# Patient Record
Sex: FEMALE | Race: WHITE | URBAN - NONMETROPOLITAN AREA
[De-identification: names, ages, dates, MRNs, and addresses within clinical notes are randomized per-mention and may not be internally consistent; named-entity substitution may affect disease eponyms.]

---

## 2022-03-28 ENCOUNTER — OFFICE VISIT (OUTPATIENT)
Dept: URBAN - NONMETROPOLITAN AREA CLINIC 4 | Facility: CLINIC | Age: 42
End: 2022-03-28
Payer: COMMERCIAL

## 2022-03-28 ENCOUNTER — LAB OUTSIDE AN ENCOUNTER (OUTPATIENT)
Dept: URBAN - NONMETROPOLITAN AREA CLINIC 4 | Facility: CLINIC | Age: 42
End: 2022-03-28

## 2022-03-28 ENCOUNTER — WEB ENCOUNTER (OUTPATIENT)
Dept: URBAN - NONMETROPOLITAN AREA CLINIC 4 | Facility: CLINIC | Age: 42
End: 2022-03-28

## 2022-03-28 VITALS
SYSTOLIC BLOOD PRESSURE: 94 MMHG | HEART RATE: 78 BPM | BODY MASS INDEX: 32.82 KG/M2 | DIASTOLIC BLOOD PRESSURE: 64 MMHG | WEIGHT: 204.2 LBS | TEMPERATURE: 98.2 F | HEIGHT: 66 IN

## 2022-03-28 DIAGNOSIS — N80.9 ENDOMETRIOSIS: ICD-10-CM

## 2022-03-28 DIAGNOSIS — N93.9 ABNORMAL VAGINAL BLEEDING: ICD-10-CM

## 2022-03-28 DIAGNOSIS — R13.10 DYSPHAGIA, UNSPECIFIED TYPE: ICD-10-CM

## 2022-03-28 DIAGNOSIS — R14.2 BELCHING: ICD-10-CM

## 2022-03-28 DIAGNOSIS — K21.9 GASTROESOPHAGEAL REFLUX DISEASE, UNSPECIFIED WHETHER ESOPHAGITIS PRESENT: ICD-10-CM

## 2022-03-28 DIAGNOSIS — R12 HEARTBURN: ICD-10-CM

## 2022-03-28 PROBLEM — 301822002: Status: ACTIVE | Noted: 2022-03-28

## 2022-03-28 PROBLEM — 129103003: Status: ACTIVE | Noted: 2022-03-28

## 2022-03-28 PROCEDURE — 99204 OFFICE O/P NEW MOD 45 MIN: CPT | Performed by: REGISTERED NURSE

## 2022-03-28 PROCEDURE — 99204 OFFICE O/P NEW MOD 45 MIN: CPT | Performed by: INTERNAL MEDICINE

## 2022-03-28 RX ORDER — LANSOPRAZOLE 30 MG
1 CAPSULE BEFORE A MEAL CAPSULE,DELAYED RELEASE (ENTERIC COATED) ORAL ONCE A DAY
Status: ACTIVE | COMMUNITY

## 2022-03-28 RX ORDER — LANOLIN ALCOHOL/MO/W.PET/CERES
1 TABLET CREAM (GRAM) TOPICAL ONCE A DAY
Status: ACTIVE | COMMUNITY

## 2022-03-28 RX ORDER — FAMOTIDINE 20 MG/1
1 TABLET AT BEDTIME AS NEEDED TABLET, FILM COATED ORAL ONCE A DAY
Status: ACTIVE | COMMUNITY

## 2022-03-28 RX ORDER — MULTIVIT-MIN/IRON/FOLIC ACID/K 18-600-40
AS DIRECTED CAPSULE ORAL
Status: ACTIVE | COMMUNITY

## 2022-03-28 RX ORDER — SUCRALFATE 1 G/1
1 TABLET ON AN EMPTY STOMACH TABLET ORAL TWICE A DAY
Status: ACTIVE | COMMUNITY

## 2022-03-28 RX ORDER — MONTELUKAST 10 MG/1
1 TABLET TABLET, FILM COATED ORAL ONCE A DAY
Status: ACTIVE | COMMUNITY

## 2022-03-28 RX ORDER — TRAZODONE HYDROCHLORIDE 50 MG/1
1 TABLET AT BEDTIME AS NEEDED TABLET, FILM COATED ORAL ONCE A DAY
Status: ACTIVE | COMMUNITY

## 2022-03-28 RX ORDER — PROPRANOLOL HYDROCHLORIDE 80 MG/1
1 TABLET TABLET ORAL TWICE A DAY
Status: ACTIVE | COMMUNITY

## 2022-03-28 RX ORDER — PANTOPRAZOLE SODIUM 40 MG/1
1 TABLET TABLET, DELAYED RELEASE ORAL ONCE A DAY
Qty: 30 | Refills: 1 | OUTPATIENT
Start: 2022-03-28

## 2022-03-28 RX ORDER — FLUOXETINE HYDROCHLORIDE 20 MG/1
1 CAPSULE CAPSULE ORAL ONCE A DAY
Status: ACTIVE | COMMUNITY

## 2022-03-28 RX ORDER — NICOTINE 14MG/24HR
1 CAPSULE PATCH, TRANSDERMAL 24 HOURS TRANSDERMAL TWICE A DAY
Status: ACTIVE | COMMUNITY

## 2022-03-28 NOTE — HPI-TODAY'S VISIT:
3/28/22: Pt is a 40 yo female with PMH of GERD, anemia, endometriosis, anxiety and depression who was referred by FAITH Peters, for evaluation of GERD.  Pt reports long stadning GERD for at least 15 years. She has never seen GI in past. She has been to ED multiple times for GERD symptoms. She has associated heartburn, worse with coffee, soda, spicy foods, fried foods, tomatoes. She has taken PPI, which had been controlling symptoms, but now worse in past 2 years. She has occasional dysphagia to solids and pills. Also reports belching and early satiety. Denies nausea, vomiting, melena, hematochezia, unintentional weight loss. She admits to taking chronic NSAIDs in past. Has never had an EGD in past. PCP recently started her on Carafate. She has stopped taking Prevacid and Pepcid. No FHx of GI malignancy. Of note, she has hx of anemia with iron, Vit D and B12 def. Most recent labs showed normal iron levels. Does not take iron supplement any more. Has been having vaginal bleeding for 5 weeks, Has f/u with GYN.

## 2022-04-21 PROBLEM — 235595009: Status: ACTIVE | Noted: 2022-03-28

## 2022-04-21 PROBLEM — 40739000: Status: ACTIVE | Noted: 2022-03-28

## 2022-05-03 ENCOUNTER — OFFICE VISIT (OUTPATIENT)
Dept: URBAN - METROPOLITAN AREA SURGERY CENTER 14 | Facility: SURGERY CENTER | Age: 42
End: 2022-05-03
Payer: COMMERCIAL

## 2022-05-03 ENCOUNTER — CLAIMS CREATED FROM THE CLAIM WINDOW (OUTPATIENT)
Dept: URBAN - METROPOLITAN AREA CLINIC 4 | Facility: CLINIC | Age: 42
End: 2022-05-03
Payer: COMMERCIAL

## 2022-05-03 DIAGNOSIS — K22.4 CORKSCREW ESOPHAGUS: ICD-10-CM

## 2022-05-03 DIAGNOSIS — K31.7 BENIGN GASTRIC POLYP: ICD-10-CM

## 2022-05-03 DIAGNOSIS — K21.9 GASTRO-ESOPHAGEAL REFLUX DISEASE WITHOUT ESOPHAGITIS: ICD-10-CM

## 2022-05-03 DIAGNOSIS — K31.7 POLYP OF STOMACH AND DUODENUM: ICD-10-CM

## 2022-05-03 DIAGNOSIS — K21.9 ACID REFLUX: ICD-10-CM

## 2022-05-03 DIAGNOSIS — R13.19 CERVICAL DYSPHAGIA: ICD-10-CM

## 2022-05-03 DIAGNOSIS — K31.89 FOCAL FOVEOLAR HYPERPLASIA: ICD-10-CM

## 2022-05-03 PROCEDURE — G8907 PT DOC NO EVENTS ON DISCHARG: HCPCS | Performed by: INTERNAL MEDICINE

## 2022-05-03 PROCEDURE — 88305 TISSUE EXAM BY PATHOLOGIST: CPT | Performed by: PATHOLOGY

## 2022-05-03 PROCEDURE — 43239 EGD BIOPSY SINGLE/MULTIPLE: CPT | Performed by: INTERNAL MEDICINE

## 2022-05-03 PROCEDURE — 43450 DILATE ESOPHAGUS 1/MULT PASS: CPT | Performed by: INTERNAL MEDICINE

## 2022-05-03 PROCEDURE — 88312 SPECIAL STAINS GROUP 1: CPT | Performed by: PATHOLOGY

## 2022-05-03 PROCEDURE — 43251 EGD REMOVE LESION SNARE: CPT | Performed by: INTERNAL MEDICINE

## 2022-05-03 RX ORDER — PANTOPRAZOLE SODIUM 40 MG/1
1 TABLET TABLET, DELAYED RELEASE ORAL ONCE A DAY
Qty: 30 | Refills: 1 | COMMUNITY
Start: 2022-03-28

## 2022-05-03 RX ORDER — TRAZODONE HYDROCHLORIDE 50 MG/1
1 TABLET AT BEDTIME AS NEEDED TABLET, FILM COATED ORAL ONCE A DAY
COMMUNITY

## 2022-05-03 RX ORDER — MONTELUKAST 10 MG/1
1 TABLET TABLET, FILM COATED ORAL ONCE A DAY
COMMUNITY

## 2022-05-03 RX ORDER — LANSOPRAZOLE 30 MG
1 CAPSULE BEFORE A MEAL CAPSULE,DELAYED RELEASE (ENTERIC COATED) ORAL ONCE A DAY
COMMUNITY

## 2022-05-03 RX ORDER — FLUOXETINE HYDROCHLORIDE 20 MG/1
1 CAPSULE CAPSULE ORAL ONCE A DAY
COMMUNITY

## 2022-05-03 RX ORDER — SUCRALFATE 1 G/1
1 TABLET ON AN EMPTY STOMACH TABLET ORAL TWICE A DAY
COMMUNITY

## 2022-05-03 RX ORDER — NICOTINE 14MG/24HR
1 CAPSULE PATCH, TRANSDERMAL 24 HOURS TRANSDERMAL TWICE A DAY
COMMUNITY

## 2022-05-03 RX ORDER — PROPRANOLOL HYDROCHLORIDE 80 MG/1
1 TABLET TABLET ORAL TWICE A DAY
COMMUNITY

## 2022-05-03 RX ORDER — LANOLIN ALCOHOL/MO/W.PET/CERES
1 TABLET CREAM (GRAM) TOPICAL ONCE A DAY
COMMUNITY

## 2022-05-03 RX ORDER — FAMOTIDINE 20 MG/1
1 TABLET AT BEDTIME AS NEEDED TABLET, FILM COATED ORAL ONCE A DAY
COMMUNITY

## 2022-05-03 RX ORDER — MULTIVIT-MIN/IRON/FOLIC ACID/K 18-600-40
AS DIRECTED CAPSULE ORAL
COMMUNITY

## 2022-05-23 ENCOUNTER — OFFICE VISIT (OUTPATIENT)
Dept: URBAN - METROPOLITAN AREA CLINIC 54 | Facility: CLINIC | Age: 42
End: 2022-05-23
Payer: COMMERCIAL

## 2022-05-23 VITALS
BODY MASS INDEX: 33.27 KG/M2 | WEIGHT: 207 LBS | HEART RATE: 78 BPM | DIASTOLIC BLOOD PRESSURE: 74 MMHG | HEIGHT: 66 IN | TEMPERATURE: 97.9 F | SYSTOLIC BLOOD PRESSURE: 111 MMHG

## 2022-05-23 DIAGNOSIS — K22.4 ESOPHAGEAL DYSMOTILITY: ICD-10-CM

## 2022-05-23 DIAGNOSIS — K21.9 GASTROESOPHAGEAL REFLUX DISEASE WITHOUT ESOPHAGITIS: ICD-10-CM

## 2022-05-23 PROBLEM — 266435005: Status: ACTIVE | Noted: 2022-05-23

## 2022-05-23 PROBLEM — 266434009: Status: ACTIVE | Noted: 2022-05-23

## 2022-05-23 PROCEDURE — 99213 OFFICE O/P EST LOW 20 MIN: CPT

## 2022-05-23 RX ORDER — PANTOPRAZOLE SODIUM 40 MG/1
1 TABLET TABLET, DELAYED RELEASE ORAL ONCE A DAY
Qty: 30 | Refills: 1 | Status: ACTIVE | COMMUNITY
Start: 2022-03-28

## 2022-05-23 RX ORDER — TRAZODONE HYDROCHLORIDE 50 MG/1
1 TABLET AT BEDTIME AS NEEDED TABLET, FILM COATED ORAL ONCE A DAY
Status: ACTIVE | COMMUNITY

## 2022-05-23 RX ORDER — NICOTINE 14MG/24HR
1 CAPSULE PATCH, TRANSDERMAL 24 HOURS TRANSDERMAL TWICE A DAY
Status: ACTIVE | COMMUNITY

## 2022-05-23 RX ORDER — MULTIVIT-MIN/IRON/FOLIC ACID/K 18-600-40
AS DIRECTED CAPSULE ORAL
Status: ACTIVE | COMMUNITY

## 2022-05-23 RX ORDER — ALUMINUM HYDROXIDE AND MAGNESIUM CARBONATE 95; 358 MG/15ML; MG/15ML
15 ML AS NEEDED FOR HEARTBURN, REPEAT EVERY 15 MIN FOR UP TO 4 DOSES LIQUID ORAL
Qty: 1 BOTTLE | Refills: 2 | OUTPATIENT
Start: 2022-05-23 | End: 2022-08-21

## 2022-05-23 RX ORDER — PROPRANOLOL HYDROCHLORIDE 80 MG/1
1 TABLET TABLET ORAL TWICE A DAY
Status: ACTIVE | COMMUNITY

## 2022-05-23 RX ORDER — PANTOPRAZOLE SODIUM 40 MG/1
1 TABLET TABLET, DELAYED RELEASE ORAL TWICE A DAY
Qty: 60 | Refills: 3
Start: 2022-03-28

## 2022-05-23 RX ORDER — MONTELUKAST 10 MG/1
1 TABLET TABLET, FILM COATED ORAL ONCE A DAY
Status: ACTIVE | COMMUNITY

## 2022-05-23 RX ORDER — LANOLIN ALCOHOL/MO/W.PET/CERES
1 TABLET CREAM (GRAM) TOPICAL ONCE A DAY
Status: ACTIVE | COMMUNITY

## 2022-05-23 RX ORDER — FLUOXETINE HYDROCHLORIDE 20 MG/1
1 CAPSULE CAPSULE ORAL ONCE A DAY
Status: ACTIVE | COMMUNITY

## 2022-05-23 NOTE — HPI-TODAY'S VISIT:
3/28/22: Pt is a 42 yo female with PMH of GERD, anemia, endometriosis, anxiety and depression who was referred by FAITH Peters, for evaluation of GERD.  Pt reports long standing GERD for at least 15 years. She has never seen GI in past. She has been to ED multiple times for GERD symptoms. She has associated heartburn, worse with coffee, soda, spicy foods, fried foods, tomatoes. She has taken PPI, which had been controlling symptoms, but now worse in past 2 years. She has occasional dysphagia to solids and pills. Also reports belching and early satiety. Denies nausea, vomiting, melena, hematochezia, unintentional weight loss. She admits to taking chronic NSAIDs in past. Has never had an EGD in past. PCP recently started her on Carafate. She has stopped taking Prevacid and Pepcid. No FHx of GI malignancy. Of note, she has hx of anemia with iron, Vit D and B12 def. Most recent labs showed normal iron levels. Does not take iron supplement any more. Has been having vaginal bleeding for 5 weeks, Has f/u with GYN.   5/23/22: Pt presents for EGD f/u of GERD. Has noted some improvement since starting Protonix qd. Still has constant, low-level heartburn rated 2/10 in severity. This has worsened in the last few days secondary to diet. Symptoms worse with soda, fried foods, citrus, and coffee. No nocturnal symptoms. Pt sleeps inclined and eats last meal several hours before lying down.  EGD 5/3/22: - Normal examined duodenum. - Multiple gastric polyps. - Z-line regular, 37 cm from the incisors. - Abnormal esophageal motility. Dilated. - Biopsies were taken with a cold forceps for histology in the entire examined stomach and h pylori testing Biopsies: Esophagus - Squamocolumnar mucosa with reflux-like changes. Stomach polyp - Fundic gland polyp.   Stomach - No abnormality.

## 2022-05-26 ENCOUNTER — TELEPHONE ENCOUNTER (OUTPATIENT)
Dept: URBAN - NONMETROPOLITAN AREA CLINIC 4 | Facility: CLINIC | Age: 42
End: 2022-05-26

## 2022-05-26 RX ORDER — PANTOPRAZOLE SODIUM 40 MG/1
1 TABLET TABLET, DELAYED RELEASE ORAL TWICE A DAY
Qty: 60 | Refills: 3
Start: 2022-03-28

## 2022-09-06 ENCOUNTER — DASHBOARD ENCOUNTERS (OUTPATIENT)
Age: 42
End: 2022-09-06

## 2022-09-19 ENCOUNTER — OFFICE VISIT (OUTPATIENT)
Dept: URBAN - METROPOLITAN AREA CLINIC 54 | Facility: CLINIC | Age: 42
End: 2022-09-19

## 2022-09-19 ENCOUNTER — ERX REFILL RESPONSE (OUTPATIENT)
Dept: URBAN - METROPOLITAN AREA CLINIC 54 | Facility: CLINIC | Age: 42
End: 2022-09-19

## 2022-09-19 RX ORDER — MULTIVIT-MIN/IRON/FOLIC ACID/K 18-600-40
AS DIRECTED CAPSULE ORAL
Status: ACTIVE | COMMUNITY

## 2022-09-19 RX ORDER — FLUOXETINE HYDROCHLORIDE 20 MG/1
1 CAPSULE CAPSULE ORAL ONCE A DAY
Status: ACTIVE | COMMUNITY

## 2022-09-19 RX ORDER — PANTOPRAZOLE SODIUM 40 MG/1
1 TABLET TABLET, DELAYED RELEASE ORAL TWICE A DAY
Qty: 60 | Refills: 3 | Status: ACTIVE | COMMUNITY
Start: 2022-03-28

## 2022-09-19 RX ORDER — NICOTINE 14MG/24HR
1 CAPSULE PATCH, TRANSDERMAL 24 HOURS TRANSDERMAL TWICE A DAY
Status: ACTIVE | COMMUNITY

## 2022-09-19 RX ORDER — MONTELUKAST 10 MG/1
1 TABLET TABLET, FILM COATED ORAL ONCE A DAY
Status: ACTIVE | COMMUNITY

## 2022-09-19 RX ORDER — PROPRANOLOL HYDROCHLORIDE 80 MG/1
1 TABLET TABLET ORAL TWICE A DAY
Status: ACTIVE | COMMUNITY

## 2022-09-19 RX ORDER — LANOLIN ALCOHOL/MO/W.PET/CERES
1 TABLET CREAM (GRAM) TOPICAL ONCE A DAY
Status: ACTIVE | COMMUNITY

## 2022-09-19 RX ORDER — TRAZODONE HYDROCHLORIDE 50 MG/1
1 TABLET AT BEDTIME AS NEEDED TABLET, FILM COATED ORAL ONCE A DAY
Status: ACTIVE | COMMUNITY

## 2022-09-19 RX ORDER — PANTOPRAZOLE 40 MG/1
TAKE ONE TABLET BY MOUTH TWO TIMES A DAY TABLET, DELAYED RELEASE ORAL
Qty: 60 TABLET | Refills: 3 | OUTPATIENT

## 2022-09-19 RX ORDER — PANTOPRAZOLE SODIUM 40 MG/1
1 TABLET TABLET, DELAYED RELEASE ORAL TWICE A DAY
Qty: 60 | Refills: 3 | OUTPATIENT

## 2022-10-17 ENCOUNTER — ERX REFILL RESPONSE (OUTPATIENT)
Dept: URBAN - METROPOLITAN AREA CLINIC 54 | Facility: CLINIC | Age: 42
End: 2022-10-17

## 2022-10-17 RX ORDER — ALUMINUM HYDROXIDE AND MAGNESIUM CARBONATE 95; 358 MG/15ML; MG/15ML
TAKE 15 ML BY MOUTH AS NEEDED FOR HEARTBURN, REPEAT EVERY 15 MIN FOR UP TO 4 DOSES LIQUID ORAL
Qty: 355 MILLILITER | Refills: 2 | OUTPATIENT

## 2022-10-17 RX ORDER — ALUMINUM HYDROXIDE AND MAGNESIUM CARBONATE 95; 358 MG/15ML; MG/15ML
TAKE 15 ML BY MOUTH AS NEEDED FOR HEARTBURN, REPEAT EVERY 15 MIN FOR UP TO 4 DOSES LIQUID ORAL
Qty: 355 MILLILITER | Refills: 3 | OUTPATIENT

## 2022-12-12 ENCOUNTER — ERX REFILL RESPONSE (OUTPATIENT)
Dept: URBAN - METROPOLITAN AREA CLINIC 54 | Facility: CLINIC | Age: 42
End: 2022-12-12

## 2022-12-12 RX ORDER — PANTOPRAZOLE 40 MG/1
TAKE ONE TABLET BY MOUTH TWICE A DAY TABLET, DELAYED RELEASE ORAL
Qty: 60 TABLET | Refills: 3 | OUTPATIENT

## 2022-12-12 RX ORDER — PANTOPRAZOLE 40 MG/1
TAKE ONE TABLET BY MOUTH TWO TIMES A DAY TABLET, DELAYED RELEASE ORAL
Qty: 60 TABLET | Refills: 3 | OUTPATIENT

## 2023-03-20 ENCOUNTER — ERX REFILL RESPONSE (OUTPATIENT)
Dept: URBAN - METROPOLITAN AREA CLINIC 54 | Facility: CLINIC | Age: 43
End: 2023-03-20

## 2023-03-20 RX ORDER — PANTOPRAZOLE 40 MG/1
TAKE ONE TABLET BY MOUTH TWICE A DAY TABLET, DELAYED RELEASE ORAL
Qty: 60 TABLET | Refills: 3 | OUTPATIENT